# Patient Record
Sex: MALE | Race: WHITE | NOT HISPANIC OR LATINO
[De-identification: names, ages, dates, MRNs, and addresses within clinical notes are randomized per-mention and may not be internally consistent; named-entity substitution may affect disease eponyms.]

---

## 2023-04-24 ENCOUNTER — APPOINTMENT (OUTPATIENT)
Dept: ORTHOPEDIC SURGERY | Facility: CLINIC | Age: 88
End: 2023-04-24
Payer: MEDICARE

## 2023-04-24 PROCEDURE — 99203 OFFICE O/P NEW LOW 30 MIN: CPT

## 2023-04-24 PROCEDURE — 73610 X-RAY EXAM OF ANKLE: CPT | Mod: LT

## 2023-04-24 NOTE — ADDENDUM
[FreeTextEntry1] : I, Darby Hu wrote this note acting as a scribe for Dr. Louis Rincon on Apr 24, 2023.

## 2023-04-24 NOTE — HISTORY OF PRESENT ILLNESS
[de-identified] : Pt is an 88 y/o male c/o left ankle pain x 1 day.  He states that he stood from a sitting position yesterday and he felt a crack in the ankle.  It became swollen.  He did not go to the ED or Urgent Care.  The pain and swelling persisted overnight.

## 2023-04-24 NOTE — END OF VISIT
[FreeTextEntry3] : All medical record entries made by the Scribe were at my,  Dr. Louis Rincon MD., direction and personally dictated by me on 04/24/2023. I have personally reviewed the chart and agree that the record accurately reflects my personal performance of the history, physical exam, assessment and plan.

## 2023-04-24 NOTE — DISCUSSION/SUMMARY
[de-identified] : The underlying pathophysiology was reviewed with the patient. XR films were reviewed with the patient. Discussed at length the nature of the patient’s condition. The left ankle symptoms are secondary to ATFL sprain.\par \par At this time, he was fitted with an air cast brace and rigid soled post-op shoe in the office today. He may WBAT. He was instructed on activity modification as tolerated by his symptoms. I recommended use of Tylenol and topical analgesics as needed as well as to soak the foot in Epsom salts and warm water. \par \par All questions answered, understanding verbalized. Patient in agreement with plan of care. Follow up in 6 weeks for reassessment, if needed.

## 2023-04-24 NOTE — PHYSICAL EXAM
[de-identified] : Patient is WDWN, alert, and in no acute distress. Breathing is unlabored. He is grossly oriented to person, place, and time.\par \par He is accompanied by his wife today.\par \par Left Ankle:\par He presents to the office ambulating with the assistance of a cane.\par There is focal tenderness laterally along the ATFL.\par No tenderness along the distal fibula.\par No medial ankle tenderness to the deltoid ligament or otherwise.\par Soft tissue edema noted laterally.\par \par  [de-identified] : AP, lateral and oblique views of the LEFT ankle were obtained today and revealed no fracture or dislocations. There is lateral soft tissue swelling.

## 2023-06-12 ENCOUNTER — APPOINTMENT (OUTPATIENT)
Dept: ORTHOPEDIC SURGERY | Facility: CLINIC | Age: 88
End: 2023-06-12
Payer: MEDICARE

## 2023-06-12 VITALS — BODY MASS INDEX: 28.25 KG/M2 | HEIGHT: 67 IN | WEIGHT: 180 LBS

## 2023-06-12 DIAGNOSIS — M25.572 PAIN IN LEFT ANKLE AND JOINTS OF LEFT FOOT: ICD-10-CM

## 2023-06-12 PROCEDURE — 99213 OFFICE O/P EST LOW 20 MIN: CPT

## 2023-06-13 NOTE — PHYSICAL EXAM
[de-identified] : Patient is WDWN, alert, and in no acute distress. Breathing is unlabored. He is grossly oriented to person, place, and time.\par \par Left Ankle:\par He presents to the office ambulating WBAT, with an air cast brace in place. \par There is focal tenderness laterally along the ATFL.\par No tenderness along the distal fibula.\par No medial ankle tenderness to the deltoid ligament or otherwise.\par Soft tissue edema noted laterally. [de-identified] : no new imaging today

## 2023-06-13 NOTE — HISTORY OF PRESENT ILLNESS
[de-identified] : Pt is an 89 y/o male c/o left ankle pain, which began on 4/23/23.  He states that he stood from a sitting position yesterday and he felt a crack in the ankle.  It became swollen.  He did not go to the ED or Urgent Care.  The pain and swelling persisted overnight. He was initially seen in the office on 4/24/23 at which time he was fitted with an air cast brace secondary to an ATFL sprain. He returns on 6/12/23 in reassessment. He has remained immobilized in the air cast brace and postoperative shoe. He notes some improvements but notes continued difficulty when walking.

## 2023-06-13 NOTE — END OF VISIT
[FreeTextEntry3] : All medical record entries made by the Scribe were at my,  Dr. Louis Rincon MD., direction and personally dictated by me on 06/12/2023. I have personally reviewed the chart and agree that the record accurately reflects my personal performance of the history, physical exam, assessment and plan.

## 2023-06-13 NOTE — DISCUSSION/SUMMARY
[de-identified] : The underlying pathophysiology was reviewed with the patient. XR films were reviewed with the patient. Discussed at length the nature of the patient’s condition. The left ankle symptoms are secondary to ATFL sprain.\par \par At this time, I recommended he begin to wean off of the air cast brace and post-op shoe. I advised he transition to a comfortable/supportive sneaker or shoe as tolerated. He was instructed on continued low impact exercise such as walking. He deferred a referral to physical therapy. \par \par All questions answered, understanding verbalized. Patient in agreement with plan of care. Follow up as needed.

## 2023-07-02 ENCOUNTER — EMERGENCY (EMERGENCY)
Facility: HOSPITAL | Age: 88
LOS: 1 days | Discharge: HOME | End: 2023-07-02
Attending: EMERGENCY MEDICINE
Payer: MEDICARE

## 2023-07-02 VITALS
WEIGHT: 175.05 LBS | HEART RATE: 88 BPM | OXYGEN SATURATION: 97 % | RESPIRATION RATE: 16 BRPM | SYSTOLIC BLOOD PRESSURE: 222 MMHG | DIASTOLIC BLOOD PRESSURE: 98 MMHG | HEIGHT: 66 IN | TEMPERATURE: 99 F

## 2023-07-02 VITALS
DIASTOLIC BLOOD PRESSURE: 75 MMHG | OXYGEN SATURATION: 100 % | RESPIRATION RATE: 18 BRPM | HEART RATE: 80 BPM | SYSTOLIC BLOOD PRESSURE: 170 MMHG

## 2023-07-02 LAB
ALBUMIN SERPL ELPH-MCNC: 4 G/DL — SIGNIFICANT CHANGE UP (ref 3.3–5)
ALP SERPL-CCNC: 38 U/L — LOW (ref 40–120)
ALT FLD-CCNC: 31 U/L — SIGNIFICANT CHANGE UP (ref 10–45)
ANION GAP SERPL CALC-SCNC: 13 MMOL/L — SIGNIFICANT CHANGE UP (ref 5–17)
ANION GAP SERPL CALC-SCNC: 13 MMOL/L — SIGNIFICANT CHANGE UP (ref 5–17)
APPEARANCE UR: ABNORMAL
APTT BLD: 28.2 SEC — SIGNIFICANT CHANGE UP (ref 27.5–35.5)
AST SERPL-CCNC: 58 U/L — HIGH (ref 10–40)
BACTERIA # UR AUTO: NEGATIVE — SIGNIFICANT CHANGE UP
BASOPHILS # BLD AUTO: 0.05 K/UL — SIGNIFICANT CHANGE UP (ref 0–0.2)
BASOPHILS NFR BLD AUTO: 0.7 % — SIGNIFICANT CHANGE UP (ref 0–2)
BILIRUB SERPL-MCNC: 0.3 MG/DL — SIGNIFICANT CHANGE UP (ref 0.2–1.2)
BILIRUB UR-MCNC: NEGATIVE — SIGNIFICANT CHANGE UP
BLD GP AB SCN SERPL QL: NEGATIVE — SIGNIFICANT CHANGE UP
BUN SERPL-MCNC: 29 MG/DL — HIGH (ref 7–23)
BUN SERPL-MCNC: 30 MG/DL — HIGH (ref 7–23)
CALCIUM SERPL-MCNC: 9.2 MG/DL — SIGNIFICANT CHANGE UP (ref 8.4–10.5)
CALCIUM SERPL-MCNC: 9.3 MG/DL — SIGNIFICANT CHANGE UP (ref 8.4–10.5)
CHLORIDE SERPL-SCNC: 102 MMOL/L — SIGNIFICANT CHANGE UP (ref 96–108)
CHLORIDE SERPL-SCNC: 103 MMOL/L — SIGNIFICANT CHANGE UP (ref 96–108)
CO2 SERPL-SCNC: 21 MMOL/L — LOW (ref 22–31)
CO2 SERPL-SCNC: 23 MMOL/L — SIGNIFICANT CHANGE UP (ref 22–31)
COLOR SPEC: ABNORMAL
CREAT SERPL-MCNC: 0.97 MG/DL — SIGNIFICANT CHANGE UP (ref 0.5–1.3)
CREAT SERPL-MCNC: 1.02 MG/DL — SIGNIFICANT CHANGE UP (ref 0.5–1.3)
DIFF PNL FLD: ABNORMAL
EGFR: 70 ML/MIN/1.73M2 — SIGNIFICANT CHANGE UP
EGFR: 75 ML/MIN/1.73M2 — SIGNIFICANT CHANGE UP
EOSINOPHIL # BLD AUTO: 0.15 K/UL — SIGNIFICANT CHANGE UP (ref 0–0.5)
EOSINOPHIL NFR BLD AUTO: 2.1 % — SIGNIFICANT CHANGE UP (ref 0–6)
EPI CELLS # UR: 0 — SIGNIFICANT CHANGE UP
GLUCOSE SERPL-MCNC: 153 MG/DL — HIGH (ref 70–99)
GLUCOSE SERPL-MCNC: 166 MG/DL — HIGH (ref 70–99)
GLUCOSE UR QL: NEGATIVE — SIGNIFICANT CHANGE UP
HCT VFR BLD CALC: 37.1 % — LOW (ref 39–50)
HGB BLD-MCNC: 12.7 G/DL — LOW (ref 13–17)
HYALINE CASTS # UR AUTO: 0 /LPF — SIGNIFICANT CHANGE UP (ref 0–7)
IMM GRANULOCYTES NFR BLD AUTO: 0.4 % — SIGNIFICANT CHANGE UP (ref 0–0.9)
INR BLD: 1.16 RATIO — SIGNIFICANT CHANGE UP (ref 0.88–1.16)
KETONES UR-MCNC: NEGATIVE — SIGNIFICANT CHANGE UP
LEUKOCYTE ESTERASE UR-ACNC: NEGATIVE — SIGNIFICANT CHANGE UP
LYMPHOCYTES # BLD AUTO: 1.79 K/UL — SIGNIFICANT CHANGE UP (ref 1–3.3)
LYMPHOCYTES # BLD AUTO: 24.9 % — SIGNIFICANT CHANGE UP (ref 13–44)
MCHC RBC-ENTMCNC: 31.4 PG — SIGNIFICANT CHANGE UP (ref 27–34)
MCHC RBC-ENTMCNC: 34.2 GM/DL — SIGNIFICANT CHANGE UP (ref 32–36)
MCV RBC AUTO: 91.6 FL — SIGNIFICANT CHANGE UP (ref 80–100)
MONOCYTES # BLD AUTO: 0.55 K/UL — SIGNIFICANT CHANGE UP (ref 0–0.9)
MONOCYTES NFR BLD AUTO: 7.7 % — SIGNIFICANT CHANGE UP (ref 2–14)
NEUTROPHILS # BLD AUTO: 4.61 K/UL — SIGNIFICANT CHANGE UP (ref 1.8–7.4)
NEUTROPHILS NFR BLD AUTO: 64.2 % — SIGNIFICANT CHANGE UP (ref 43–77)
NITRITE UR-MCNC: NEGATIVE — SIGNIFICANT CHANGE UP
NRBC # BLD: 0 /100 WBCS — SIGNIFICANT CHANGE UP (ref 0–0)
PH UR: 6 — SIGNIFICANT CHANGE UP (ref 5–8)
PLATELET # BLD AUTO: 197 K/UL — SIGNIFICANT CHANGE UP (ref 150–400)
POTASSIUM SERPL-MCNC: 3.7 MMOL/L — SIGNIFICANT CHANGE UP (ref 3.5–5.3)
POTASSIUM SERPL-MCNC: 5.8 MMOL/L — HIGH (ref 3.5–5.3)
POTASSIUM SERPL-SCNC: 3.7 MMOL/L — SIGNIFICANT CHANGE UP (ref 3.5–5.3)
POTASSIUM SERPL-SCNC: 5.8 MMOL/L — HIGH (ref 3.5–5.3)
PROT SERPL-MCNC: 7.5 G/DL — SIGNIFICANT CHANGE UP (ref 6–8.3)
PROT UR-MCNC: ABNORMAL
PROTHROM AB SERPL-ACNC: 13.5 SEC — HIGH (ref 10.5–13.4)
RBC # BLD: 4.05 M/UL — LOW (ref 4.2–5.8)
RBC # FLD: 13.2 % — SIGNIFICANT CHANGE UP (ref 10.3–14.5)
RBC CASTS # UR COMP ASSIST: >50 /HPF — HIGH (ref 0–4)
RH IG SCN BLD-IMP: POSITIVE — SIGNIFICANT CHANGE UP
SODIUM SERPL-SCNC: 136 MMOL/L — SIGNIFICANT CHANGE UP (ref 135–145)
SODIUM SERPL-SCNC: 139 MMOL/L — SIGNIFICANT CHANGE UP (ref 135–145)
SP GR SPEC: 1.03 — HIGH (ref 1.01–1.02)
UROBILINOGEN FLD QL: ABNORMAL
WBC # BLD: 7.18 K/UL — SIGNIFICANT CHANGE UP (ref 3.8–10.5)
WBC # FLD AUTO: 7.18 K/UL — SIGNIFICANT CHANGE UP (ref 3.8–10.5)
WBC UR QL: 0 /HPF — SIGNIFICANT CHANGE UP (ref 0–5)

## 2023-07-02 PROCEDURE — 80048 BASIC METABOLIC PNL TOTAL CA: CPT

## 2023-07-02 PROCEDURE — 74178 CT ABD&PLV WO CNTR FLWD CNTR: CPT | Mod: 26,MA

## 2023-07-02 PROCEDURE — 80053 COMPREHEN METABOLIC PANEL: CPT

## 2023-07-02 PROCEDURE — 99285 EMERGENCY DEPT VISIT HI MDM: CPT | Mod: GC

## 2023-07-02 PROCEDURE — 86901 BLOOD TYPING SEROLOGIC RH(D): CPT

## 2023-07-02 PROCEDURE — 85610 PROTHROMBIN TIME: CPT

## 2023-07-02 PROCEDURE — 85730 THROMBOPLASTIN TIME PARTIAL: CPT

## 2023-07-02 PROCEDURE — 36415 COLL VENOUS BLD VENIPUNCTURE: CPT

## 2023-07-02 PROCEDURE — 86850 RBC ANTIBODY SCREEN: CPT

## 2023-07-02 PROCEDURE — 86900 BLOOD TYPING SEROLOGIC ABO: CPT

## 2023-07-02 PROCEDURE — 74178 CT ABD&PLV WO CNTR FLWD CNTR: CPT | Mod: MA

## 2023-07-02 PROCEDURE — 85025 COMPLETE CBC W/AUTO DIFF WBC: CPT

## 2023-07-02 PROCEDURE — 99284 EMERGENCY DEPT VISIT MOD MDM: CPT | Mod: 25

## 2023-07-02 PROCEDURE — 87086 URINE CULTURE/COLONY COUNT: CPT

## 2023-07-02 PROCEDURE — 81001 URINALYSIS AUTO W/SCOPE: CPT

## 2023-07-02 RX ORDER — TAMSULOSIN HYDROCHLORIDE 0.4 MG/1
1 CAPSULE ORAL
Qty: 30 | Refills: 0
Start: 2023-07-02 | End: 2023-07-31

## 2023-07-02 RX ORDER — FINASTERIDE 5 MG/1
1 TABLET, FILM COATED ORAL
Qty: 30 | Refills: 0
Start: 2023-07-02 | End: 2023-07-31

## 2023-07-02 NOTE — ED PROVIDER NOTE - PATIENT PORTAL LINK FT
You can access the FollowMyHealth Patient Portal offered by [facility] by registering at the following website: [email or web address]. By joining [employer]’s FollowMyHealth portal, you will also be able to view your health information using other applications (apps) compatible with our system.

## 2023-07-02 NOTE — CONSULT NOTE ADULT - ASSESSMENT
89y Male PMH of breast cancer s/p resection with new onset painless gross hematuria  -formal recs pending imaging 89y Male PMH of breast cancer s/p resection with new onset painless gross hematuria most likely from sig enlarged prostate.  Pt voiding well and color remains dilute watermelon. No need for rizvi catheter or CBI  May dc home with fu at scheduled office apt on 7/18 with [facility] for outpt cystoscopy    If sooner apt warranted may call [facility] for Urology Dr. Dion Bello [phone]    may cont oral abx from outpt  can cont to hold prophylactic baby asa  inc po liquid hydration  bowel regimen to avoid hard stool  no heavy lifting  start Flomax 0.4mg po qhs  start Proscar 5mg po qd       89y Male PMH of breast cancer s/p resection with new onset painless gross hematuria most likely from sig enlarged prostate.  Pt voiding well and color remains dilute watermelon. No need for rizvi catheter or CBI  May dc home with fu at scheduled office apt on 7/18 with [facility] for outpt cystoscopy  - incidental renal lesion to be further eval at fu    If sooner apt warranted may call [facility] for Urology Dr. Ashley Bello [phone]    may cont oral abx from outpt  can cont to hold prophylactic baby asa  inc po liquid hydration  bowel regimen to avoid hard stool  no heavy lifting  start Flomax 0.4mg po qhs  start Proscar 5mg po qd

## 2023-07-02 NOTE — ED ADULT NURSE NOTE - NS ED NOTE  TALK SOMEONE YN
Sheath #1: Sheath: inserted. Sheath inserted/placed in the right femoral  artery. Hemostasis achieved. No

## 2023-07-02 NOTE — ED PROVIDER NOTE - ATTENDING CONTRIBUTION TO CARE
Hx: painless hematuria, worsening, assoc with clots, for few days.  No fever, vomiting.  Ex-45yr smoker.  No previous workup.  ON antibx from PCP.  Unable to f/u urologist for weeks, here for eval.    PE: well appearing, nontoxic, nontender abdomen, pink conj, no CVA td.    MDM: painless hematuria in elderly patient, ex-smoker, concern for malignancy, consider bladder vs renal disease, check cbc r/o anemia or leukocytosis, check bmp to r/o metabolic derangement and lyte imbalance, ct abdomen, ua, urologist consult.  Given inability to f/u outpatient timely, will expedite initial workup given high risk for pathological/malignant cause for hematuria.

## 2023-07-02 NOTE — ED PROVIDER NOTE - PHYSICAL EXAMINATION
No appts with Dr Guzman or Neris.     Daughter would prefer not to go to [facility].     Spoke to Dr Guzman. If no appts and unable to work in, ok for urine dip.   
GENERAL: no acute distress, endomorphic body habitus  HEENT: atraumatic, normocephalic, vision grossly intact, EOMI, no conjunctivitis or discharge, hearing grossly intact, no nasal discharge or epistaxis, clear pharynx  CV: systolic murmur best heard in L upper sternal border, regular rate, normal rhythm, normal S1/S2, no cyanosis  PULM: normal work of breathing, clear breath sounds in b/l upper/lower lung fields, no crackles/rales/rhonchi/wheezing  GI: soft/non-tender/nondistended abdomen, no guarding or rebound tenderness, no palpable masses  : blood clot at urethral opening w/ no evidence of trauma or TTP, no CVA tenderness  NEURO: A&Ox4, follows commands, normal speech, no focal motor or sensory deficits  MSK: no joint tenderness/swelling/erythema, ranging all extremities with no appreciable loss of ROM  EXT: no peripheral edema, no calf tenderness, no redness or swelling  SKIN: warm, dry, and intact, no rashes  PSYCH: appropriate mood and affect

## 2023-07-02 NOTE — ED PROVIDER NOTE - NSFOLLOWUPINSTRUCTIONS_ED_ALL_ED_FT
you have a scheduled office apt on 7/18 with [facility] for outpt cystoscopy    If sooner apt warranted may call [facility] for Urology Dr. Dion Bello [phone]    you may continue your oral antibiotics   you can continue to stop your baby asa, until you follow up with urology  please increase liquid hydration  start a bowel regimen to avoid hard stool  no heavy lifting  start Flomax 0.4mg   start Proscar 5mg you were seen in the emergency department for painless hematuria.     SEEK IMMEDIATE MEDICAL CARE IF YOU HAVE ANY OF THE FOLLOWING SYMPTOMS: severe back or abdominal pain, fever, inability to keep fluids or medicine down, dizziness/lightheadedness, or a change in mental status, inability to urinate, large blood clots, pain while urinating.     you have a scheduled office apt on 7/18 with [facility] for outpt cystoscopy    If sooner apt warranted may call [facility] for Urology Dr. Dion Bello [phone]    you may continue your oral antibiotics   you can continue to stop your baby asa, until you follow up with urology  please increase liquid hydration  start a bowel regimen to avoid hard stool  no heavy lifting  start Flomax 0.4mg   start Proscar 5mg

## 2023-07-02 NOTE — ED ADULT NURSE NOTE - OBJECTIVE STATEMENT
88 y/o male presents to the ED endorsing hematuria since 6/29/23. A/Ox4. Ambulatory without assistive devices at baseline. PMH: HTN, HLD and Breast CA w/ Mastectomy. Patient endorses recent PCP visit on 6/29/23 where he was referred to see a urologist. Patient endorses his appointment with the urologist on 7/18/23 however, he could not wait. Patient describes the blood as "dark red with no clots". Patient denies chest pain, dyspnea, fever, cough, chills, nausea and vomiting. Safety and comfort provided. 89 y/o male presents to the ED endorsing hematuria since 6/29/23. A/Ox4. Ambulatory without assistive devices at baseline. PMH: HTN, HLD and Breast CA w/ Mastectomy. Patient endorses recent PCP visit on 6/29/23 where he was referred to see a urologist. Patient endorses his appointment with the urologist on 7/18/23 however, he could not wait. Patient describes the blood as "dark red with no clots". Bladder scan revealed 103mL of urine. Patient denies chest pain, dyspnea, fever, cough, chills, nausea and vomiting. Safety and comfort provided.

## 2023-07-02 NOTE — ED ADULT TRIAGE NOTE - PRO INTERPRETER NEED 2
How Severe Is Your Acne?: mild
Is This A New Presentation, Or A Follow-Up?: Acne
Additional Comments (Use Complete Sentences): Acne facial #[phone]
English

## 2023-07-02 NOTE — ED ADULT NURSE REASSESSMENT NOTE - NS ED NURSE REASSESS COMMENT FT1
Report received from JACOBO Tesfaye. Pt AAOx4, NAD, resp nonlabored, skin warm/dry, resting comfortably in bed with family at bedside. Pt presented to ED c/o hematuria. Pt denies headache, dizziness, chest pain, palpitations, SOB, abd pain, n/v/d, urinary pain, fevers, chills, weakness at this time. Pt awaiting CT results and urology consult . Safety maintained with call bell within reach.

## 2023-07-02 NOTE — CONSULT NOTE ADULT - SUBJECTIVE AND OBJECTIVE BOX
HPI:  Patient is a 89y Male PMH of breast cancer s/p resection presents to the ED complaining of gross hematuria x 2 days. Patient went to his PCP regarding the hematuria, was prescribed antibiotics and took 2 days worth, but the hematuria got worse, which prompted him to come in. He otherwise denies fevers, chills, nausea, vomiting, flank pain, dysuria, weight loss. Admits to being a former smoker for many years. States he was also taking ASA81 for routine health, in which his PCP told him to stop.   In the ED, bladder scan showed 115cc, then he voided ~50cc afterwards.    PAST MEDICAL & SURGICAL HISTORY:    FAMILY HISTORY:    SOCIAL HISTORY:   Tobacco hx:  MEDICATIONS  (STANDING):    MEDICATIONS  (PRN):    Allergies    No Known Allergies    Intolerances        REVIEW OF SYSTEMS: Pertinent positives and negatives as stated in HPI, otherwise negative    Vital signs  T(C): 36.7 (23 @ 03:50), Max: 37.2 (23 @ 02:09)  HR: 82 (23 @ 03:50)  BP: 162/70 (23 @ 03:50)  SpO2: 98% (23 @ 03:50)  Wt(kg): --    Output      Physical Exam  Gen: NAD  Pulm: No respiratory distress, no subcostal retractions  Abd: Soft, NT, ND, no rebound/guarding, palpable masses  : Urine sample at bedside strawberry with 1 small clot      LABS:         @ 04:26    WBC 7.18  / Hct 37.1  / SCr 0.97         136  |  102  |  30<H>  ----------------------------<  166<H>  5.8<H>   |  21<L>  |  0.97    Ca    9.2      2023 04:26    TPro  7.5  /  Alb  4.0  /  TBili  0.3  /  DBili  x   /  AST  58<H>  /  ALT  31  /  AlkPhos  38<L>        Urinalysis Basic - ( 2023 04:26 )    Color: Red / Appearance: Turbid / S.026 / pH: x  Gluc: 166 mg/dL / Ketone: Negative  / Bili: Negative / Urobili: 2 mg/dL   Blood: x / Protein: 300 mg/dL / Nitrite: Negative   Leuk Esterase: Negative / RBC: >50 /hpf / WBC 0 /HPF   Sq Epi: x / Non Sq Epi: x / Bacteria: Negative        Urine Cx: pending      Radiology:       HPI:  Patient is a 89y Male PMH of breast cancer s/p resection presents to the ED complaining of gross hematuria x 2 days. Patient went to his PCP regarding the hematuria, was prescribed antibiotics and took 2 days worth, but the hematuria got worse, which prompted him to come in. He otherwise denies fevers, chills, nausea, vomiting, flank pain, dysuria, weight loss. Admits to being a former smoker for many years. States he was also taking ASA81 for routine health, in which his PCP told him to stop.   In the ED, bladder scan showed 115cc, then he voided ~50cc afterwards.    PAST MEDICAL & SURGICAL HISTORY:    FAMILY HISTORY:    SOCIAL HISTORY:   Tobacco hx:  MEDICATIONS  (STANDING):    MEDICATIONS  (PRN):    Allergies    No Known Allergies    Intolerances        REVIEW OF SYSTEMS: Pertinent positives and negatives as stated in HPI, otherwise negative    Vital signs  T(C): 36.7 (23 @ 03:50), Max: 37.2 (23 @ 02:09)  HR: 82 (23 @ 03:50)  BP: 162/70 (23 @ 03:50)  SpO2: 98% (23 @ 03:50)  Wt(kg): --    Output      Physical Exam  Gen: NAD  Pulm: No respiratory distress, no subcostal retractions  Abd: Soft, NT, ND, no rebound/guarding, palpable masses  : Urine sample at bedside strawberry with 1 small clot      LABS:                          12.7   7.18  )-----------( 197      ( 2023 04:26 )             37.1           136  |  102  |  30<H>  ----------------------------<  166<H>  5.8<H>   |  21<L>  |  0.97    Ca    9.2      2023 04:26    TPro  7.5  /  Alb  4.0  /  TBili  0.3  /  DBili  x   /  AST  58<H>  /  ALT  31  /  AlkPhos  38<L>  07      Urinalysis Basic - ( 2023 04:26 )    Color: Red / Appearance: Turbid / S.026 / pH: x  Gluc: 166 mg/dL / Ketone: Negative  / Bili: Negative / Urobili: 2 mg/dL   Blood: x / Protein: 300 mg/dL / Nitrite: Negative   Leuk Esterase: Negative / RBC: >50 /hpf / WBC 0 /HPF   Sq Epi: x / Non Sq Epi: x / Bacteria: Negative        Urine Cx: sent and rec    Radiology:  < from: CT Abdomen and Pelvis Urogram w/wo IV Cont (23 @ 06:14) >  FINDINGS:  LOWER CHEST: Mild bibasilar atelectasis. Coronary artery calcifications    LIVER: Within normal limits.  BILE DUCTS: Normal caliber.  GALLBLADDER: Within normal limits.  SPLEEN: Within normal limits.  PANCREAS: Unremarkable  ADRENALS: Within normal limits.  KIDNEYS/URETERS: 3.6 cm cyst arising from the upper pole left kidney,   with a 0.9 cm nodule along the inferolateral peripheryof this cyst best   seen on axial series 4, image 28 and coronal series 606, image 58.   Kidneys enhance symmetrically. No hydronephrosis or hydroureter. The   opacified portions of the ureter are unremarkable without mural   thickening or stenosis.    BLADDER: Partially distended and unremarkable  REPRODUCTIVE ORGANS: Enlarged prostate    BOWEL: No bowel obstruction. Appendix is normal.  PERITONEUM: No free air or ascites. Mild mesenteric panniculitis in the   central small bowel mesentery.  VESSELS: Atherosclerotic changes.  RETROPERITONEUM/LYMPH NODES: No lymphadenopathy.  ABDOMINAL WALL: Fat-containing left inguinal hernia  BONES: Degenerative changes.    IMPRESSION:    1. Indeterminate nodule draped along the periphery of the left renal   upper pole cyst, indeterminate. A short-term (1-3 month) CT or MRI renal   protocol study is advised.  2. Marked prostatomegaly.          < end of copied text >

## 2023-07-02 NOTE — ED PROVIDER NOTE - PROGRESS NOTE DETAILS
Ale PGY1: Bladder scan only shows 103 mL. Ordering CT abd/pelvis urogram w/ and w/o IV con. Urology deferring final recs till CT result. Margareth Martel MD (PGY-2 EM): patient stable, case discussed with urology, no need for Benitez, will give meds, dc with urology follow up.

## 2023-07-03 LAB
CULTURE RESULTS: NO GROWTH — SIGNIFICANT CHANGE UP
SPECIMEN SOURCE: SIGNIFICANT CHANGE UP

## 2023-07-18 PROBLEM — C50.919 MALIGNANT NEOPLASM OF UNSPECIFIED SITE OF UNSPECIFIED FEMALE BREAST: Chronic | Status: ACTIVE | Noted: 2023-07-02

## 2023-07-18 PROBLEM — E78.5 HYPERLIPIDEMIA, UNSPECIFIED: Chronic | Status: ACTIVE | Noted: 2023-07-02

## 2023-07-18 PROBLEM — I10 ESSENTIAL (PRIMARY) HYPERTENSION: Chronic | Status: ACTIVE | Noted: 2023-07-02

## 2023-07-31 ENCOUNTER — OUTPATIENT (OUTPATIENT)
Dept: OUTPATIENT SERVICES | Facility: HOSPITAL | Age: 88
LOS: 1 days | End: 2023-07-31
Payer: MEDICARE

## 2023-07-31 ENCOUNTER — APPOINTMENT (OUTPATIENT)
Dept: MRI IMAGING | Facility: IMAGING CENTER | Age: 88
End: 2023-07-31
Payer: MEDICARE

## 2023-07-31 DIAGNOSIS — Z00.8 ENCOUNTER FOR OTHER GENERAL EXAMINATION: ICD-10-CM

## 2023-07-31 PROCEDURE — 74183 MRI ABD W/O CNTR FLWD CNTR: CPT

## 2023-07-31 PROCEDURE — 74183 MRI ABD W/O CNTR FLWD CNTR: CPT | Mod: 26,MH

## 2023-07-31 PROCEDURE — A9585: CPT

## 2024-01-30 NOTE — ED PROVIDER NOTE - OBJECTIVE STATEMENT
89-year-old male with history of breast cancer s/p lumpectomy (10 years ago), HTN on atenolol, HLD on Lipitor and hypokalemia presents for 4 days of worsening gross hematuria w/ no pain or hx of trauma.  Patient was seen by PCP on Thursday for hematuria.  PCP discontinued aspirin, prescribed antibiotics (day 2), and was asked to follow-up with [facility].  Patient was only able to get urology appointment for 7/18/2023.  Patient initially reported a few drops of blood at the beginning of urinary stream which cleared.  However, now reports gross hematuria with passage of clots.  He only reports 1 remote previous episode of hematuria, but denies any history of UTIs or trauma to groin.  He endorses urinary dribbling, but denies any urinary/fecal incontinencem or fevers/chills/coughs/sore throats.  He has a history of smoking and alcohol use. PCP is Dr. Jos Myers.
Resident
